# Patient Record
Sex: MALE
[De-identification: names, ages, dates, MRNs, and addresses within clinical notes are randomized per-mention and may not be internally consistent; named-entity substitution may affect disease eponyms.]

---

## 2023-03-31 ENCOUNTER — APPOINTMENT (OUTPATIENT)
Dept: UROLOGY | Facility: CLINIC | Age: 28
End: 2023-03-31
Payer: COMMERCIAL

## 2023-03-31 VITALS
SYSTOLIC BLOOD PRESSURE: 138 MMHG | DIASTOLIC BLOOD PRESSURE: 79 MMHG | BODY MASS INDEX: 24.52 KG/M2 | HEART RATE: 87 BPM | WEIGHT: 185 LBS | HEIGHT: 73 IN | OXYGEN SATURATION: 100 % | TEMPERATURE: 98.2 F

## 2023-03-31 DIAGNOSIS — N52.9 MALE ERECTILE DYSFUNCTION, UNSPECIFIED: ICD-10-CM

## 2023-03-31 PROBLEM — Z00.00 ENCOUNTER FOR PREVENTIVE HEALTH EXAMINATION: Status: ACTIVE | Noted: 2023-03-31

## 2023-03-31 PROCEDURE — 99203 OFFICE O/P NEW LOW 30 MIN: CPT

## 2023-03-31 RX ORDER — TADALAFIL 5 MG/1
5 TABLET ORAL
Qty: 10 | Refills: 5 | Status: ACTIVE | COMMUNITY
Start: 2023-03-31 | End: 1900-01-01

## 2023-03-31 NOTE — ASSESSMENT
[FreeTextEntry1] : Assessment: 27-year-old male with new onset erectile dysfunction, likely related to Propecia use.  Unclear potential contribution of Vyvanse. \par \par \par Plan: \par -Discontinue Propecia\par -Trial of tadalafil 5 mg every 24 hours as needed advised that he can try half pills initially - The most common adverse reactions (>2%) of Cialis include headache, dyspepsia, back pain, myalgia, nasal congestion, and flushing. There is also a risk of prolonged erection, particularly those at increased risk for priapism. No prior history of cardiac disease, chest pain, or use of nitrates.  Advised of the medication can last up to 36 hours.\par -Follow-up as needed\par

## 2023-03-31 NOTE — HISTORY OF PRESENT ILLNESS
[FreeTextEntry1] : Name FLAKITA PIEDRA \par MRN [id number] \par  1995 \par ------------------------------------------------------------------------------------------------------------------------------------------- \par Date of First Visit: 3/31/23\par Referring Provider/PCP: self ([facility]) \par ------------------------------------------------------------------------------------------------------------------------------------------- \par CC: sudden onset erectile dysfunction\par \par History of Present Illness: FLAKITA PIEDRA is a 27 year male h/o ADHD who presents for new onset erectile dysfunction that presented 1 week ago.  Acknowledges that he also has reduced libido as well.  Difficult to maintain firm erections including after penetration.  Believes it is due to adjustment of his ADHD medications but is seeking expert opinion.  Of note, the patient has been taking Propecia for the last 18 months to prevent hair loss (ordered through [facility]).\par